# Patient Record
Sex: FEMALE | Race: WHITE | NOT HISPANIC OR LATINO | Employment: UNEMPLOYED | URBAN - METROPOLITAN AREA
[De-identification: names, ages, dates, MRNs, and addresses within clinical notes are randomized per-mention and may not be internally consistent; named-entity substitution may affect disease eponyms.]

---

## 2023-08-23 ENCOUNTER — HOSPITAL ENCOUNTER (OUTPATIENT)
Dept: DATA CONVERSION | Facility: HOSPITAL | Age: 33
Discharge: HOME | End: 2023-08-23
Payer: COMMERCIAL

## 2023-08-23 DIAGNOSIS — Z11.3 ENCOUNTER FOR SCREENING FOR INFECTIONS WITH A PREDOMINANTLY SEXUAL MODE OF TRANSMISSION: ICD-10-CM

## 2023-08-23 LAB
C TRACH RRNA SPEC QL NAA+PROBE: NORMAL
DRUG SCREEN COMMENT UR-IMP: NORMAL
HBV SURFACE AG SER QL: NEGATIVE
N GONORRHOEA RRNA SPEC QL NAA+PROBE: NORMAL
SPECIMEN SOURCE: NORMAL

## 2023-08-24 LAB
C GLABRATA DNA VAG QL NAA+PROBE: NEGATIVE
C KRUSEI DNA VAG QL NAA+PROBE: NEGATIVE
CANDIDA DNA VAG QL PROBE+SIG AMP: NEGATIVE
HCV AB SER QL: NORMAL
HIV 1+2 AB+HIV1 P24 AG SERPL QL IA: NORMAL
SYPHILIS SCREEN RSLT -LH SQ DATA CONVERSION: NORMAL
T VAGINALIS DNA VAG QL PROBE+SIG AMP: NEGATIVE
VAGINOSIS/ITIS DNA PNL VAG PROBE+SIG AMP: NEGATIVE

## 2023-09-07 ENCOUNTER — HOSPITAL ENCOUNTER (OUTPATIENT)
Dept: DATA CONVERSION | Facility: HOSPITAL | Age: 33
Discharge: HOME | End: 2023-09-07
Payer: COMMERCIAL

## 2023-09-07 DIAGNOSIS — N76.0 ACUTE VAGINITIS: ICD-10-CM

## 2023-09-07 LAB
C TRACH RRNA SPEC QL NAA+PROBE: NORMAL
DRUG SCREEN COMMENT UR-IMP: NORMAL
N GONORRHOEA RRNA SPEC QL NAA+PROBE: NORMAL
SPECIMEN SOURCE: NORMAL

## 2023-09-08 LAB
C GLABRATA DNA VAG QL NAA+PROBE: NEGATIVE
C KRUSEI DNA VAG QL NAA+PROBE: NEGATIVE
CANDIDA DNA VAG QL PROBE+SIG AMP: NEGATIVE
T VAGINALIS DNA VAG QL PROBE+SIG AMP: NEGATIVE
VAGINOSIS/ITIS DNA PNL VAG PROBE+SIG AMP: NEGATIVE

## 2024-02-08 ENCOUNTER — PROCEDURE VISIT (OUTPATIENT)
Dept: OBSTETRICS AND GYNECOLOGY | Facility: CLINIC | Age: 34
End: 2024-02-08
Payer: COMMERCIAL

## 2024-02-08 VITALS — BODY MASS INDEX: 22.94 KG/M2 | WEIGHT: 125.4 LBS

## 2024-02-08 DIAGNOSIS — Z30.46 NEXPLANON REMOVAL: Primary | ICD-10-CM

## 2024-02-08 PROCEDURE — 11982 REMOVE DRUG IMPLANT DEVICE: CPT | Performed by: OBSTETRICS & GYNECOLOGY

## 2024-02-08 RX ORDER — AMOXICILLIN 500 MG/1
CAPSULE ORAL
COMMUNITY
Start: 2024-02-06

## 2024-02-08 ASSESSMENT — PATIENT HEALTH QUESTIONNAIRE - PHQ9
1. LITTLE INTEREST OR PLEASURE IN DOING THINGS: NOT AT ALL
2. FEELING DOWN, DEPRESSED OR HOPELESS: NOT AT ALL
SUM OF ALL RESPONSES TO PHQ9 QUESTIONS 1 AND 2: 0

## 2024-02-08 ASSESSMENT — PAIN SCALES - GENERAL: PAINLEVEL: 0-NO PAIN

## 2024-02-08 ASSESSMENT — ENCOUNTER SYMPTOMS
OCCASIONAL FEELINGS OF UNSTEADINESS: 0
DEPRESSION: 0
LOSS OF SENSATION IN FEET: 0

## 2024-02-08 NOTE — PROGRESS NOTES
Patient ID: Anai Sapp is a 33 y.o. female.    ProceduresNexplanon Removal Note       Date of Removal:  02/08/24  No LMP recorded. Patient has had an implant.    Information related to removal of the implant:   Reason(s) for removal:  Desire pregnancy  Was implant palpable before removal?  Yes    Procedure Time Out Documentation       Procedure:    Implant identified.  Left upper arm prepped with Betadinex3.  1% lidocaine injected at planned incision site.  A vertical incision 2-3 mm was performed with an 15-blade scalpel at the distal end of implant.  The implant was removed using pop-out technique.  The implant was inspected and found to be intact and complete and was discarded.  Steri strips and then a bandaid and gauze pressure wrap dressing were applied to the site.  After removal instructions were given and verbally reviewed with the patient who acknowledged her understanding.      Difficulties with the implant removal procedure?  No    Birth control plans are none.

## 2024-10-09 ENCOUNTER — OFFICE VISIT (OUTPATIENT)
Dept: PRIMARY CARE | Facility: CLINIC | Age: 34
End: 2024-10-09
Payer: COMMERCIAL

## 2024-10-09 VITALS
DIASTOLIC BLOOD PRESSURE: 70 MMHG | BODY MASS INDEX: 23.98 KG/M2 | HEART RATE: 73 BPM | WEIGHT: 127 LBS | HEIGHT: 61 IN | SYSTOLIC BLOOD PRESSURE: 106 MMHG

## 2024-10-09 DIAGNOSIS — F41.0 PANIC ATTACKS: ICD-10-CM

## 2024-10-09 DIAGNOSIS — F41.9 ANXIETY: ICD-10-CM

## 2024-10-09 DIAGNOSIS — G43.E09 CHRONIC MIGRAINE WITH AURA WITHOUT STATUS MIGRAINOSUS, NOT INTRACTABLE: ICD-10-CM

## 2024-10-09 DIAGNOSIS — K21.9 GASTROESOPHAGEAL REFLUX DISEASE WITHOUT ESOPHAGITIS: Primary | ICD-10-CM

## 2024-10-09 PROBLEM — F43.10 POSTTRAUMATIC STRESS DISORDER: Status: ACTIVE | Noted: 2023-07-14

## 2024-10-09 PROBLEM — R07.9 CHEST PAIN: Status: ACTIVE | Noted: 2024-10-09

## 2024-10-09 PROBLEM — N76.0 VULVOVAGINITIS: Status: ACTIVE | Noted: 2024-04-17

## 2024-10-09 PROBLEM — T50.901A DRUG OVERDOSE: Status: ACTIVE | Noted: 2024-10-09

## 2024-10-09 PROBLEM — N94.89 VULVAR BURNING: Status: ACTIVE | Noted: 2024-04-17

## 2024-10-09 PROBLEM — G43.909 MIGRAINES: Status: ACTIVE | Noted: 2023-07-14

## 2024-10-09 PROBLEM — R87.810 CERVICAL HIGH RISK HPV (HUMAN PAPILLOMAVIRUS) TEST POSITIVE: Status: ACTIVE | Noted: 2021-09-13

## 2024-10-09 PROBLEM — N83.209 OVARIAN CYST RUPTURE: Status: ACTIVE | Noted: 2021-12-29

## 2024-10-09 PROBLEM — N93.9 ABNORMAL UTERINE BLEEDING: Status: ACTIVE | Noted: 2024-10-09

## 2024-10-09 PROBLEM — N89.8 VAGINAL ODOR: Status: ACTIVE | Noted: 2024-04-17

## 2024-10-09 PROBLEM — N89.8 VAGINAL IRRITATION: Status: ACTIVE | Noted: 2024-10-09

## 2024-10-09 PROCEDURE — 1036F TOBACCO NON-USER: CPT | Performed by: INTERNAL MEDICINE

## 2024-10-09 PROCEDURE — 3008F BODY MASS INDEX DOCD: CPT | Performed by: INTERNAL MEDICINE

## 2024-10-09 PROCEDURE — 99204 OFFICE O/P NEW MOD 45 MIN: CPT | Performed by: INTERNAL MEDICINE

## 2024-10-09 RX ORDER — RIMEGEPANT SULFATE 75 MG/75MG
75 TABLET, ORALLY DISINTEGRATING ORAL AS NEEDED
Qty: 16 TABLET | Refills: 1 | Status: SHIPPED | OUTPATIENT
Start: 2024-10-09

## 2024-10-09 RX ORDER — MINERAL OIL
180 ENEMA (ML) RECTAL
COMMUNITY
Start: 2023-05-30

## 2024-10-09 RX ORDER — CALC/MAG/B COMPLEX/D3/HERB 61
15 TABLET ORAL
COMMUNITY
Start: 2024-03-07 | End: 2024-10-09 | Stop reason: SDUPTHER

## 2024-10-09 RX ORDER — NAPROXEN 500 MG/1
1 TABLET ORAL
COMMUNITY
Start: 2024-01-15

## 2024-10-09 RX ORDER — RIMEGEPANT SULFATE 75 MG/75MG
75 TABLET, ORALLY DISINTEGRATING ORAL AS NEEDED
COMMUNITY
End: 2024-10-09 | Stop reason: SDUPTHER

## 2024-10-09 RX ORDER — CALC/MAG/B COMPLEX/D3/HERB 61
15 TABLET ORAL
Qty: 90 CAPSULE | Refills: 1 | Status: SHIPPED | OUTPATIENT
Start: 2024-10-09

## 2024-10-09 RX ORDER — CLOTRIMAZOLE AND BETAMETHASONE DIPROPIONATE 10; .64 MG/G; MG/G
1 CREAM TOPICAL 2 TIMES DAILY
COMMUNITY
Start: 2024-04-17

## 2024-10-09 RX ORDER — CLONAZEPAM 0.5 MG/1
0.5 TABLET ORAL DAILY
COMMUNITY
Start: 2024-04-19 | End: 2024-10-09 | Stop reason: SDUPTHER

## 2024-10-09 RX ORDER — CLONAZEPAM 0.5 MG/1
TABLET ORAL
Qty: 10 TABLET | Refills: 0 | Status: SHIPPED | OUTPATIENT
Start: 2024-10-09

## 2024-10-09 RX ORDER — FLUTICASONE PROPIONATE 50 MCG
1 SPRAY, SUSPENSION (ML) NASAL DAILY
COMMUNITY

## 2024-10-09 RX ORDER — BETA CAROTENE, CHOLECALCIFEROL, DL-ALPHA TOCOPHERYL ACETATE, ASCORBIC ACID, FOLIC ACID, THIAMIN MONONITRATE, RIBOFLAVIN, NIACINAMIDE, PYRIDOXINE HYDROCHLORIDE, CYANOCOBALAMIN, CALCIUM CARBONATE, POTAS 120; 4000; 200; 400; 8; 29; 1; 20; 150; 3; 3; 3; 15 MG/1; [IU]/1; MG/1; [IU]/1; UG/1; MG/1; MG/1; MG/1; UG/1; MG/1; MG/1; MG/1; MG/1
1 TABLET, FILM COATED ORAL
COMMUNITY
Start: 2024-08-26

## 2024-10-09 NOTE — PROGRESS NOTES
Assessment/Plan   Problem List Items Addressed This Visit       Anxiety    Relevant Medications    clonazePAM (KlonoPIN) 0.5 mg tablet    Other Relevant Orders    Referral to Psychiatry    Gastroesophageal reflux disease - Primary    Relevant Medications    lansoprazole (Prevacid) 15 mg DR capsule    Migraines    Relevant Medications    Nurtec ODT 75 mg tablet,disintegrating    Panic attacks    Relevant Medications    clonazePAM (KlonoPIN) 0.5 mg tablet    Other Relevant Orders    Referral to Psychiatry   Conditions discussed  In view of the fact that she will plan for pregnancy she must discuss for any medication to be to stopped even before planning for pregnancy as per advised from OB  She was also mentioned that she must see psychiatrist for optimal treatment and care in relation to anxiety and panic attack  Clonazepam is not the answer for the problem only 10 tablet is being prescribed for acute setting during intervening.  GERD she is taking PPI refill provided    Migraine she should follow with neurologist in the meantime prescription has been given but also she should discuss with OB about continuation of this medication and alternative at the time when she plan for pregnancy    Subjective   Patient ID: Anai Sapp is a 34 y.o. female who presents for Establish Care (Patient states that she needed to get a new PCP due to no longer working at [employer] [facility] [employer].  ).    Past Surgical History:   Procedure Laterality Date    BREAST SURGERY      x2    LABIAPLASTY        Family History   Problem Relation Name Age of Onset    Hypertension Mother      Hyperlipidemia Mother      Crohn's disease Father        Social History     Socioeconomic History    Marital status: Single     Spouse name: Not on file    Number of children: Not on file    Years of education: Not on file    Highest education level: Not on file   Occupational History    Not on file   Tobacco Use    Smoking status: Former     Current packs/day: 0.00     Types:  Cigarettes     Quit date:      Years since quittin.7    Smokeless tobacco: Never   Vaping Use    Vaping status: Some Days   Substance and Sexual Activity    Alcohol use: Yes     Alcohol/week: 2.0 - 3.0 standard drinks of alcohol     Types: 2 - 3 Standard drinks or equivalent per week    Drug use: Yes     Types: Marijuana     Comment: on occasion    Sexual activity: Not on file   Other Topics Concern    Not on file   Social History Narrative    Not on file     Social Determinants of Health     Financial Resource Strain: Patient Declined (2023)    Received from [employer], [employer]    Overall Financial Resource Strain (CARDIA)     Difficulty of Paying Living Expenses: Patient declined   Food Insecurity: Patient Declined (2023)    Received from [employer]    Hunger Vital Sign     Worried About Running Out of Food in the Last Year: Patient declined     Ran Out of Food in the Last Year: Patient declined   Transportation Needs: Patient Declined (2023)    Received from [employer], [employer]    PRAPARE - Transportation     Lack of Transportation (Medical): Patient declined     Lack of Transportation (Non-Medical): Patient declined   Physical Activity: Sufficiently Active (2023)    [facility]    Exercise Vital Sign     Days of Exercise per Week: 5 days     Minutes of Exercise per Session: 60 min   Stress: Stress Concern Present (2023)    [facility] [place] [facility] - Occupational Stress Questionnaire     Feeling of Stress : To some extent   Social Connections: Socially Isolated (2023)    [facility]    Social Connection and Isolation Panel [NHANES]     Frequency of Communication with Friends and Family: More than three times a week     Frequency of Social Gatherings with Friends and Family: Three  "times a week     Attends Baptism Services: Never     Active Member of Clubs or Organizations: No     Attends Club or Organization Meetings: Never     Marital Status:    Intimate Partner Violence: Patient Declined (9/5/2023)    Received from [employer], [employer]    Humiliation, Afraid, Rape, and Kick questionnaire     Fear of Current or Ex-Partner: Patient declined     Emotionally Abused: Patient declined     Physically Abused: Patient declined     Sexually Abused: Patient declined   Housing Stability: Unknown (9/5/2023)    Received from [employer], [employer]    Housing Stability Vital Sign     Unable to Pay for Housing in the Last Year: Patient refused     Number of Places Lived in the Last Year: Not on file     Unstable Housing in the Last Year: Patient refused      Sumatriptan   Current Outpatient Medications   Medication Sig Dispense Refill    clotrimazole-betamethasone (Lotrisone) cream Apply 1 Application topically 2 times a day.      fexofenadine (Allegra) 180 mg tablet Take 1 tablet (180 mg) by mouth once daily.      fluticasone (Flonase) 50 mcg/actuation nasal spray Administer 1 spray into each nostril once daily.      naproxen (Naprosyn) 500 mg tablet Take 1 tablet (500 mg) by mouth 2 times a day before meals.      Prenatabs FA 29-1 mg tablet Take 1 tablet by mouth early in the morning..      clonazePAM (KlonoPIN) 0.5 mg tablet Use daqily prn at panic and anxiety attack 10 tablet 0    lansoprazole (Prevacid) 15 mg DR capsule Take 1 capsule (15 mg) by mouth once daily. 90 capsule 1    Nurtec ODT 75 mg tablet,disintegrating Take 1 tablet (75 mg) by mouth if needed (Migraines). 16 tablet 1     No current facility-administered medications for this visit.      Vitals:    10/09/24 1519   BP: 106/70   BP Location: Left arm   Patient Position: Sitting   Pulse: 73   Weight: 57.6 kg (127 lb)   Height: 1.549 m (5' 1\")      Problem List Items Addressed This Visit       Anxiety    Relevant " Medications    clonazePAM (KlonoPIN) 0.5 mg tablet    Other Relevant Orders    Referral to Psychiatry    Gastroesophageal reflux disease - Primary    Relevant Medications    lansoprazole (Prevacid) 15 mg DR capsule    Migraines    Relevant Medications    Nurtec ODT 75 mg tablet,disintegrating    Panic attacks    Relevant Medications    clonazePAM (KlonoPIN) 0.5 mg tablet    Other Relevant Orders    Referral to Psychiatry      Orders Placed This Encounter   Procedures    Referral to Psychiatry     Standing Status:   Future     Standing Expiration Date:   10/9/2025     Referral Priority:   Routine     Referral Type:   Consultation     Referral Reason:   Specialty Services Required     Requested Specialty:   Psychiatry     Number of Visits Requested:   1        HPI  This 34-year-old female who came as a new patient with review to get refill on her medication  She says that she has been on clonazepam over 10 years and being used intermittently and has been given by other physician who was taking care of  She has sometimes anxiety and panic attack  She did not want to take medication daily  She is also discussing with OB/GYN regarding pregnancy planning  She is not sure whether she has seen psychiatrist and I do not know the status of the people who prescribed clonazepam to her from [facility] report which was reviewed  She has seen neurologist and she had migraine and uses Nurtec for migraine attack  Safety of Nurtec in pregnancy should also be discussed by OB physician she was told and should only take when it is okayed by OB  She has been taking Prevacid for reflux symptoms as well    ROS otherwise has been negative other than mentioned above  Past medical history  As mentioned above  Which are GERD  Allergic rhinitis  Chronic migraine  Anxiety and panic attack  Social family history allergies and current medications as recorded    PHYSICAL EXAM  Cardiovascular chest abdominal neurological examination normal  Chart and  "labs reviewed    No results found for: \"PR1\", \"BMPR1A\", \"CMPLAS\", \"QB6GRIND\", \"KPSAT\"   No results found for: \"CHOL\", \"LDLCALC\", \"HDLC\", \"LCTRG\", \"CHHDL\"             "

## 2024-10-11 ENCOUNTER — OFFICE VISIT (OUTPATIENT)
Dept: BEHAVIORAL HEALTH | Facility: CLINIC | Age: 34
End: 2024-10-11
Payer: COMMERCIAL

## 2024-10-11 VITALS
HEART RATE: 71 BPM | HEIGHT: 61 IN | TEMPERATURE: 98.3 F | SYSTOLIC BLOOD PRESSURE: 109 MMHG | BODY MASS INDEX: 23.68 KG/M2 | RESPIRATION RATE: 18 BRPM | DIASTOLIC BLOOD PRESSURE: 63 MMHG | WEIGHT: 125.4 LBS

## 2024-10-11 DIAGNOSIS — F41.0 PANIC ATTACKS: ICD-10-CM

## 2024-10-11 DIAGNOSIS — F41.9 ANXIETY: ICD-10-CM

## 2024-10-11 ASSESSMENT — PAIN SCALES - GENERAL: PAINLEVEL: 0-NO PAIN

## 2024-10-15 ENCOUNTER — OFFICE VISIT (OUTPATIENT)
Dept: PRIMARY CARE | Facility: CLINIC | Age: 34
End: 2024-10-15
Payer: COMMERCIAL

## 2024-10-15 VITALS
SYSTOLIC BLOOD PRESSURE: 107 MMHG | BODY MASS INDEX: 23.75 KG/M2 | HEART RATE: 74 BPM | DIASTOLIC BLOOD PRESSURE: 76 MMHG | WEIGHT: 125.8 LBS | HEIGHT: 61 IN

## 2024-10-15 DIAGNOSIS — M54.2 CERVICALGIA: Primary | ICD-10-CM

## 2024-10-15 PROCEDURE — 99213 OFFICE O/P EST LOW 20 MIN: CPT | Performed by: INTERNAL MEDICINE

## 2024-10-15 PROCEDURE — 3008F BODY MASS INDEX DOCD: CPT | Performed by: INTERNAL MEDICINE

## 2024-10-15 PROCEDURE — 1036F TOBACCO NON-USER: CPT | Performed by: INTERNAL MEDICINE

## 2024-10-15 ASSESSMENT — PATIENT HEALTH QUESTIONNAIRE - PHQ9
SUM OF ALL RESPONSES TO PHQ9 QUESTIONS 1 AND 2: 0
1. LITTLE INTEREST OR PLEASURE IN DOING THINGS: NOT AT ALL
2. FEELING DOWN, DEPRESSED OR HOPELESS: NOT AT ALL

## 2024-10-15 NOTE — PROGRESS NOTES
"Assessment/Plan   Problem List Items Addressed This Visit       Cervicalgia - Primary    Relevant Orders    Referral to Physical Therapy   From the description it is a mechanical neck pain  And in view of the plan of pregnancy and other things imaging studies and x-ray exposure is being avoided  Heating pad and physical therapy advised  Follow-up as needed    Subjective   Patient ID: Anai Sapp is a 34 y.o. female who presents for Neck Pain (Patient states that she was at a music festival in September and \"tweaked\" her neck.  States that she has a hard time with lifting her head up and down.  ).    Past Surgical History:   Procedure Laterality Date    BREAST SURGERY      x2    LABIAPLASTY        Family History   Problem Relation Name Age of Onset    Hypertension Mother      Hyperlipidemia Mother      Crohn's disease Father        Social History     Socioeconomic History    Marital status: Single     Spouse name: Not on file    Number of children: Not on file    Years of education: Not on file    Highest education level: Not on file   Occupational History    Not on file   Tobacco Use    Smoking status: Former     Current packs/day: 0.00     Types: Cigarettes     Quit date:      Years since quittin.7    Smokeless tobacco: Never   Vaping Use    Vaping status: Some Days   Substance and Sexual Activity    Alcohol use: Yes     Alcohol/week: 2.0 - 3.0 standard drinks of alcohol     Types: 2 - 3 Standard drinks or equivalent per week    Drug use: Yes     Types: Marijuana     Comment: on occasion    Sexual activity: Not on file   Other Topics Concern    Not on file   Social History Narrative    Not on file     Social Determinants of Health     Financial Resource Strain: Patient Declined (2023)    Received from [employer] [employer] [employer]    Overall Financial Resource Strain (CARDIA)     Difficulty of Paying Living Expenses: Patient declined   Food Insecurity: Patient Declined (2023)    " Received from [employer]    Hunger Vital Sign     Worried About Running Out of Food in the Last Year: Patient declined     Ran Out of Food in the Last Year: Patient declined   Transportation Needs: Patient Declined (9/5/2023)    Received from [employer], [employer]    PRAPARE - Transportation     Lack of Transportation (Medical): Patient declined     Lack of Transportation (Non-Medical): Patient declined   Physical Activity: Sufficiently Active (9/5/2023)    Received from [employer], [employer], [employer]    Exercise Vital Sign     Days of Exercise per Week: 5 days     Minutes of Exercise per Session: 60 min   Stress: Stress Concern Present (9/5/2023)    Received from [employer], [employer]    [place] of Occupational Health - Occupational Stress Questionnaire     Feeling of Stress : To some extent   Social Connections: Socially Isolated (9/5/2023)    Received from [employer], [employer]    Social Connection and Isolation Panel [NHANES]     Frequency of Communication with Friends and Family: More than three times a week     Frequency of Social Gatherings with Friends and Family: Three times a week     Attends Taoist Services: Never     Active Member of Clubs or Organizations: No     Attends Club or Organization Meetings: Never     Marital Status:    Intimate Partner Violence: Patient Declined (9/5/2023)    Received from [employer], [employer]    Humiliation, Afraid, Rape, and Kick questionnaire     Fear of Current or Ex-Partner: Patient declined     Emotionally Abused: Patient declined     Physically Abused: Patient declined     Sexually Abused: Patient declined   Housing Stability: Unknown (9/5/2023)    Received from [employer]    Housing Stability Vital Sign     Unable to Pay for Housing in the Last Year: Patient refused     Number of Places Lived in the Last Year: Not on file     Unstable Housing in the  "Last Year: Patient refused      Sumatriptan   Current Outpatient Medications   Medication Sig Dispense Refill    clonazePAM (KlonoPIN) 0.5 mg tablet Use daqily prn at panic and anxiety attack 10 tablet 0    clotrimazole-betamethasone (Lotrisone) cream Apply 1 Application topically 2 times a day.      fexofenadine (Allegra) 180 mg tablet Take 1 tablet (180 mg) by mouth once daily.      fluticasone (Flonase) 50 mcg/actuation nasal spray Administer 1 spray into each nostril once daily.      lansoprazole (Prevacid) 15 mg DR capsule Take 1 capsule (15 mg) by mouth once daily. 90 capsule 1    naproxen (Naprosyn) 500 mg tablet Take 1 tablet (500 mg) by mouth 2 times a day before meals.      Nurtec ODT 75 mg tablet,disintegrating Take 1 tablet (75 mg) by mouth if needed (Migraines). 16 tablet 1    Prenatabs FA 29-1 mg tablet Take 1 tablet by mouth early in the morning..       No current facility-administered medications for this visit.      Vitals:    10/15/24 1511   BP: 107/76   BP Location: Left arm   Patient Position: Sitting   Pulse: 74   Weight: 57.1 kg (125 lb 12.8 oz)   Height: 1.549 m (5' 1\")      Problem List Items Addressed This Visit       Cervicalgia - Primary    Relevant Orders    Referral to Physical Therapy      Orders Placed This Encounter   Procedures    Referral to Physical Therapy     Standing Status:   Future     Standing Expiration Date:   10/15/2025     Referral Priority:   Routine     Referral Type:   Consultation     Referral Reason:   Specialty Services Required     Requested Specialty:   Physical Therapy     Number of Visits Requested:   1        HPI  See above patient presenting symptoms  She continues to have neck pain discomfort without radiating to the arm and she has been using some heating pad without benefit      ROS otherwise negative  Past medical history reviewed  Social and family history reviewed  Allergies and medications reviewed  Recent labs reviewed  Vital signs reviewed    PHYSICAL " "EXAM  Movement of the neck causes discomfort in the nape of the neck and the back      No results found for: \"PR1\", \"BMPR1A\", \"CMPLAS\", \"XV9UFMAD\", \"KPSAT\"   No results found for: \"CHOL\", \"LDLCALC\", \"HDLC\", \"LCTRG\", \"CHHDL\"             "

## 2024-10-25 RX ORDER — NAPROXEN 500 MG/1
500 TABLET ORAL
Qty: 180 TABLET | Refills: 1 | OUTPATIENT
Start: 2024-10-25

## 2024-11-07 ENCOUNTER — PATIENT MESSAGE (OUTPATIENT)
Dept: PRIMARY CARE | Facility: CLINIC | Age: 34
End: 2024-11-07
Payer: COMMERCIAL

## 2024-11-07 DIAGNOSIS — G43.E09 CHRONIC MIGRAINE WITH AURA WITHOUT STATUS MIGRAINOSUS, NOT INTRACTABLE: ICD-10-CM

## 2024-11-07 RX ORDER — RIMEGEPANT SULFATE 75 MG/75MG
75 TABLET, ORALLY DISINTEGRATING ORAL AS NEEDED
Qty: 16 TABLET | Refills: 1 | Status: CANCELLED | OUTPATIENT
Start: 2024-11-07

## 2024-11-07 RX ORDER — NAPROXEN 500 MG/1
500 TABLET ORAL
Qty: 180 TABLET | Refills: 1 | Status: SHIPPED | OUTPATIENT
Start: 2024-11-07

## 2024-11-13 ENCOUNTER — APPOINTMENT (OUTPATIENT)
Dept: PRIMARY CARE | Facility: CLINIC | Age: 34
End: 2024-11-13
Payer: COMMERCIAL

## 2024-11-13 ASSESSMENT — LIFESTYLE VARIABLES: HISTORY_OF_SMOKING: I SMOKED IN THE PAST, BUT NOT REGULARLY

## 2024-11-15 ENCOUNTER — APPOINTMENT (OUTPATIENT)
Dept: BEHAVIORAL HEALTH | Facility: CLINIC | Age: 34
End: 2024-11-15
Payer: COMMERCIAL

## 2025-01-16 ENCOUNTER — PATIENT MESSAGE (OUTPATIENT)
Dept: ENDOCRINOLOGY | Facility: CLINIC | Age: 35
End: 2025-01-16
Payer: COMMERCIAL

## 2025-01-16 ASSESSMENT — LIFESTYLE VARIABLES
HISTORY_ALCOHOL_USE: YES
TOBACCO_USE: NO
TOBACCO_USE: NO
HISTORY_ALCOHOL_USE: YES

## 2025-01-17 ENCOUNTER — CONSULT (OUTPATIENT)
Dept: ENDOCRINOLOGY | Facility: CLINIC | Age: 35
End: 2025-01-17
Payer: COMMERCIAL

## 2025-01-17 ENCOUNTER — LAB (OUTPATIENT)
Dept: LAB | Facility: LAB | Age: 35
End: 2025-01-17
Payer: COMMERCIAL

## 2025-01-17 VITALS
BODY MASS INDEX: 25.45 KG/M2 | HEART RATE: 83 BPM | DIASTOLIC BLOOD PRESSURE: 70 MMHG | SYSTOLIC BLOOD PRESSURE: 103 MMHG | HEIGHT: 61 IN | WEIGHT: 134.8 LBS

## 2025-01-17 DIAGNOSIS — Z13.29 SCREENING FOR THYROID DISORDER: ICD-10-CM

## 2025-01-17 DIAGNOSIS — Z31.41 FERTILITY TESTING: ICD-10-CM

## 2025-01-17 DIAGNOSIS — Z11.59 ENCOUNTER FOR SCREENING FOR OTHER VIRAL DISEASES: ICD-10-CM

## 2025-01-17 DIAGNOSIS — Z01.83 ENCOUNTER FOR RH BLOOD TYPING: ICD-10-CM

## 2025-01-17 DIAGNOSIS — Z01.812 ENCOUNTER FOR PREPROCEDURAL LABORATORY EXAMINATION: ICD-10-CM

## 2025-01-17 DIAGNOSIS — Z13.71 SCREENING FOR GENETIC DISEASE CARRIER STATUS: ICD-10-CM

## 2025-01-17 DIAGNOSIS — N97.9 SECONDARY FEMALE INFERTILITY: Primary | ICD-10-CM

## 2025-01-17 LAB
ABO GROUP (TYPE) IN BLOOD: NORMAL
ANTIBODY SCREEN: NORMAL
RH FACTOR (ANTIGEN D): NORMAL
RUBV IGG SERPL IA-ACNC: 1.1 IA
RUBV IGG SERPL QL IA: POSITIVE
TSH SERPL-ACNC: 0.69 MIU/L (ref 0.44–3.98)
VARICELLA ZOSTER IGG INDEX: 5.2 IA
VZV IGG SER QL IA: POSITIVE

## 2025-01-17 PROCEDURE — 83516 IMMUNOASSAY NONANTIBODY: CPT

## 2025-01-17 PROCEDURE — 99214 OFFICE O/P EST MOD 30 MIN: CPT

## 2025-01-17 PROCEDURE — 99204 OFFICE O/P NEW MOD 45 MIN: CPT

## 2025-01-17 PROCEDURE — 36415 COLL VENOUS BLD VENIPUNCTURE: CPT

## 2025-01-17 ASSESSMENT — PATIENT HEALTH QUESTIONNAIRE - PHQ9
2. FEELING DOWN, DEPRESSED OR HOPELESS: NOT AT ALL
SUM OF ALL RESPONSES TO PHQ9 QUESTIONS 1 AND 2: 0
1. LITTLE INTEREST OR PLEASURE IN DOING THINGS: NOT AT ALL

## 2025-01-17 ASSESSMENT — PAIN SCALES - GENERAL: PAINLEVEL_OUTOF10: 0-NO PAIN

## 2025-01-17 ASSESSMENT — COLUMBIA-SUICIDE SEVERITY RATING SCALE - C-SSRS
1. IN THE PAST MONTH, HAVE YOU WISHED YOU WERE DEAD OR WISHED YOU COULD GO TO SLEEP AND NOT WAKE UP?: NO
2. HAVE YOU ACTUALLY HAD ANY THOUGHTS OF KILLING YOURSELF?: NO
6. HAVE YOU EVER DONE ANYTHING, STARTED TO DO ANYTHING, OR PREPARED TO DO ANYTHING TO END YOUR LIFE?: NO

## 2025-01-17 NOTE — PROGRESS NOTES
Visit Type: In Person    NEW FERTILITY PATIENT VISIT    Referred by:  none    Accompanied today by: Saul call       Anai Sapp is a 34 y.o.  female who presents with secondary infertility, trying to conceive x 13 months, FSH levels at home states she has diminished reserve, would like to discuss fertility testing and treatment.      Have you had any concerns about your fertility treatments so far? No   What are you goals for today's visit? Learn how to bexome pregant   What causes of infertility have been identified on your workup so far? Diminishing eggs   Past Infertility Treatments: No    Please summarize your fertility treatments to date.     As far as you are aware, do you have insurance coverage for fertility diagnostic testing and/or fertility treatments?        PRIOR EVALUATION / TREATMENT: none    Hysterosalpingogram: no  Saline Infused Sonography: no  GYN Pelvic Ultrasound: no      Prior Labs  Lab Results    Date Done      AMH: No results found for requested labs within last 1825 days. No results found for requested labs within last 1825 days.   TSH: 0.90 (Ref range: 0.44 - 3.98 mIU/L) 2023   PRL: 7.0 (Ref range: 3.0 - 20.0 ug/L) 2023   Testosterone: No results found for requested labs within last 1825 days. No results found for requested labs within last 1825 days.   DHEAS: No results found for requested labs within last 1825 days. No results found for requested labs within last 1825 days.   FSH: 9.8 (Ref range: IU/L) 2023   17 OHP: No results found for requested labs within last 1825 days. No results found for requested labs within last 1825 days.   HgbA1c: 5.3 (Ref range: 4.3 - 5.6 %) 10/4/2021   Hepatitis B surface antigen: negative 2024   Hepatitis C antibody: negative 2024   HIV ½ Antigen Antibody screen with reflex: negative 2024   Syphilis screening with reflex: negative 2024   GC: negative 2024   CT: negative 2024   Type and Screen: B  2021   Rh: POS 2021   Antibody: No results found for requested labs within last 1825 days. No results found for requested labs within last 1825 days.   Rubella: No results found for requested labs within last 1825 days. No results found for requested labs within last 1825 days.   Varicella: No results found for requested labs within last 1825 days. No results found for requested labs within last 1825 days.   Hemoglobin: No results found for requested labs within last 1825 days. No results found for requested labs within last 1825 days.   Hematocrit: No results found for requested labs within last 1825 days. No results found for requested labs within last 1825 days.   Creatinine: 0.71 (Ref range: 0.50 - 1.05 mg/dL) 9/3/2023   AST:20 (Ref range: 9 - 39 U/L) 9/3/2023   ALT:22 (Ref range: 7 - 45 U/L): 9/3/2023     Relationship Status:  getting     Have you ever been pregnant? Yes  How many times have you been pregnant? 3  Have you ever had a miscarriage? Yes  How many times have you had a miscarriage? 1       OB Hx: :  EAB 2015 FT IOL  female 6.10 no complications pregnancy/delivery   SAB partial molar pregnancy with D&C     OB History          3    Para   1    Term   1            AB   2    Living   1         SAB   1    IAB        Ectopic        Multiple        Live Births   1                 GYN HISTORY   Have you ever been diagnosed with a sexually transmitted disease? No  Have you ever had Pelvic Inflammatory Disease?   Have you had an abnormal PAP smear? HPV, Yes  Date & Result of last PAP smear: 2033- negative  Have you ever had an abnormal Mammogram? No  Date & result of your last mammogram:  NA  Do you have pelvic pain? No  How many times per week do you have intercourse? 1-2x a day  Do you have pain with intercourse? No  Do you use lubricants with intercourse?    Do you have pain with bowel movements? No  Do you have pain with a full bladder?  No    MENSTRUAL HISTORY  LMP: 2025  Menarche: 14 years old  Contraception:  IUD, OCP- P4 only, nexplanon, nuvaring  Cycle length: 30  Describe your bleedin-5 days Average  Dysmenorrhea:  no     ENDOCRINE/INFERTILITY HISTORY  Duration of infertility: 13 months  Coital Activity/week: 1-2x a day  Nipple Discharge: No  Vision changes: No  Headaches: yes- migraines with aura  Excess hair growth: No  Excessive hair loss: No  Acne: No  Oily skin: No  Recent weight change  Weight gain: No  Weight loss: No  Exercise more than 3 times a week: Yes      PMH  History reviewed. No pertinent past medical history.     MEDICATIONS  Current Outpatient Medications on File Prior to Visit   Medication Sig Dispense Refill    clonazePAM (KlonoPIN) 0.5 mg tablet Use daqily prn at panic and anxiety attack 10 tablet 0    clotrimazole-betamethasone (Lotrisone) cream Apply 1 Application topically 2 times a day.      fexofenadine (Allegra) 180 mg tablet Take 1 tablet (180 mg) by mouth once daily.      fluticasone (Flonase) 50 mcg/actuation nasal spray Administer 1 spray into each nostril once daily.      lansoprazole (Prevacid) 15 mg DR capsule Take 1 capsule (15 mg) by mouth once daily. 90 capsule 1    naproxen (Naprosyn) 500 mg tablet Take 1 tablet (500 mg) by mouth 2 times a day before meals. 180 tablet 1    Nurtec ODT 75 mg tablet,disintegrating Take 1 tablet (75 mg) by mouth if needed (Migraines). 16 tablet 1    Prenatabs FA 29-1 mg tablet Take 1 tablet by mouth early in the morning..       No current facility-administered medications on file prior to visit.        PSH  Past Surgical History:   Procedure Laterality Date    BREAST SURGERY      x2    LABIAPLASTY          PSYCH HISTORY  Have you ever been diagnosed with a mental health Issue? No  Have you ever been hospitalized for a mental health disorder? No       SOCIAL HISTORY  Social History     Tobacco Use    Smoking status: Former     Current packs/day: 0.00     Types:  "Cigarettes     Quit date:      Years since quitting: 10.0    Smokeless tobacco: Never   Vaping Use    Vaping status: Some Days   Substance Use Topics    Alcohol use: Yes     Alcohol/week: 2.0 - 3.0 standard drinks of alcohol     Types: 2 - 3 Standard drinks or equivalent per week    Drug use: Not Currently     Types: Marijuana     Comment: on occasion     Occupation:  real estate  Have you ever been incarcerated? No  Do you have a history of domestic violence? No  Do you feel safe at home? No  Do you have a history of any negative sexual experience such as incest or rape? No       PARTNER HISTORY  Partner Name: Pro call  Partner : [id number]  Partner email: [email or web address]  Occupation: [employer]  Prior fertility history:  yes- 1  PMH:  no  PSH:  no  Smoking:Alcohol Use: Yes  Drug Use: No  Medications:  protonix  Injuries: No  STD: No  SA: No  SA Results:  NA    FAMILY HISTORY  Family History   Problem Relation Name Age of Onset    Hypertension Mother      Hyperlipidemia Mother      Crohn's disease Father         CANCER HISTORY    Breast: No  Ovarian: No  Colon:  no  Endometrial: No      FAMILY VTE HISTORY  Family History of Blood Clots: No      GENETIC HISTORY  Ethnic Background  Patient: Turkish and Bermudian  Partner: Sri Lankan and Yakut  Genetic Disease in Family  Patient: No  Partner: No  Birth Defects in Family  Patient: No  Partner: No    Genetic screening performed previously:  no     BMI:   BMI Readings from Last 1 Encounters:   25 25.47 kg/m²     VITALS:  /70   Pulse 83   Ht 1.549 m (5' 1\")   Wt 61.1 kg (134 lb 12.8 oz)   LMP 2025   BMI 25.47 kg/m²     ASSESSMENT   34 y.o.  female with  secondary infertility x 13 months, suspected ovulation and the following pertinent medical issues: h/o partial molar pregnancy  with D&C.    Partner SA: No Assessment    IVF Sart Calculator for IVF success:    With 1st IVF cycle: A complete cycle includes all fresh and " frozen-thawed embryo transfers resulting from one egg collection.  **Your chance of having your first baby after the 1st complete cycle of IVF is: 57.05%. This means that out of 100 couples undertaking a 1st complete cycle, approximately 57 would have a baby.    With 2nd IVF cycle:  A complete cycle includes all fresh and frozen-thawed embryo transfers resulting from one egg collection.  **Your chance of having your first baby after a maximum of 2 complete cycles of IVF is: 75.74%. This means that out of 100 couples undertaking a maximum of 2 complete cycles, approximately 76 would have a baby.      COUNSELING  We discussed causes of infertility including hormonal, egg quality issues, structural problems such as endometriosis, adhesions, or tubal problems, uterine factors such as polyps or fibroids, and sperm issues. Reviewed evaluation of such as well. We discussed various methods for achieving pregnancy in some detail including, ovulation induction, insemination, superovulation and IVF.    We discussed that Letrozole is used for ovulation induction and that recent studies have found letrozole is superior to Clomid for ovulation induction in patients with PCOS. We discussed common side effects of Letrozole including headaches, vision changes, hot flashes, mood changes, and breast tenderness.  Letrozole is NOT FDA approved for the treatment of infertility and was initially associated with (in some studies) a higher risk of congenital anomalies, although this was not found in a more recent large study of patients taking Letrozole for unexplained infertility. Patients must take a pregnancy test prior to starting Letrozole each month. We discussed that there is an increased risk of multiple gestation with Letrozole approximately 10% for twins and less than 1% for triplets.  Counseled regarding option of selective reduction for higher order multiples.    We discussed AMH testing and the patient's AMH level, if  "applicable.  AMH is considered favorable if >1 however this test has many limitations including poor predictive rates of pregnancy. In randomized control trials such as \"Time to conceive\" pts with low AMH levels (<0.7) has similar cumulative pregnancy rates compared with women that had normal AMH levels.  In the \"AMIGOS\" study, AMH did not predict pregnancy rates following OS/IUI for unexplained infertility. However,  AMH is a marker that is helpful to predict how a patient may respond or oocyte yields with FSH and ART treatments, but again there is conflicting data about how this affects pregnancy rates with ART.    INSTRUCTIONS FOR INFERTILITY TESTING    Semen Analysis  The semen samples that you have been asked to submit are important for diagnostic and therapeutic purposes.  Please abstain from ejaculating 2-3 days prior to collecting the sample.  The sample should be produced by masturbation.  You will need to collect the ejaculate into the container supplied by the [facility] (you can get containers at your doctor's office).  Do not use other plastic containers from home such as Tupperware as these containers as they may interfere with the test results.  Lubricants and saliva also interfere with test results.  If a collection condom is necessary, please request one at the Andrology lab and they will provide you with an appropriate collection condom.  It is extremely important that the entire sample is collected in the container as the first few drops of ejaculate contain a major portion of sperm.  If you do not collect the entire specimen, please inform the technologist.      You can collect at home as long as you can get the sample to the [facility] lab within 1 hour of collecting.  Please carmen your name and time of collection on the container. You should carry the container close to your body.  Collection rooms are available at all locations as well.     An appointment is needed to ensure the lab " has enough time to process your specimen.  Please call [phone] to schedule this appointment.     Hysterosalpingogram (HSG or x-ray dye test)  An HSG is a test used to make sure your fallopian tubes are not blocked.  This test can only be done between cycle days 5-11, so it is important for you to call as soon as your period starts to schedule an appointment.  You should take ibuprofen 30 minutes before your appointment as this test can cause transient cramping.      If you are allergic to iodine or shellfish, please inform the . Please call [phone] to schedule this appointment.      PELVIC ULTRASOUNDS  Please call [phone] to schedule this appointment.      Continue to take a prenatal vitamin daily (800mcg or 0.8mg folic acid)    Please reach out to the office for a plan once all the testing is complete- I am not notified when everything on this list is complete, so please call the office directly to check in. The office number is [phone].    If you are interested in integrative medicine to support your care, please contact [facility] at [phone]. Services available include acupuncture, integrative medicine consultations, massage, meditation and stress management. For acupuncture specifically, please ask for Leela Ovalle (east side) or Gaviota Centeno (west side).     We also offer a virtual support group every Monday from 6pm-7pm. For more info please email:    For more information and to RSVP, email [email or web address]       If you have any questions and wish to review with a member of our team, please do CALL THE OFFICE during business hours. The office number is [phone].       Routine Testing  Fertility Center  STDs Within 1 year   Genetic carrier Waiver/Completed   T&S Within 1 year   AMH Within 1 year   TSH Within 1 year   Rubella/Varicella Within 5 years     PLAN  AMH  T&S  TSH  STDs already done 8-2024 &  9-2024  Rubella/Varicella  [facility]  Pelvic Ultrasound- call CD 1 to schedule  HSG- call CD 1 to schedule  try to schedule US & HSG same day since coming from [place]   Financial consult- for HSG & IVF, has caresomaximo- may get onto partner's insurance after they get   Schedule IVF consult- may want gender selection  Chart to primary nurse for care coordination and patient check list/education  Enroll in Engaged MD  Take prenatal vitamins, vitamin D 2000 IUs daily  Discussed that pap and mammogram must be updated per ACOG guidelines before treatment can begin- up to date for pap test  Discussed that treatment cannot proceed until checklist items are complete   6-8 week IN PERSON- lives in [place], or can do virtual visit across state line  Additional testing for BMI < 18 or > 40: NA      MD Completion:  Ectopic Risk: No  Medically Complex: No    Partner:   SA  STDs  [facility]  Recommend Male Vitamins: Discussed as follows: Co-Q10 200 mg daily, L-Carnitine 200-500 mg daily, Vitamin C 500 mg daily    Intimate Exam Performed: No, an intimate exam was not performed at this encounter.     FOLLOW UP PLAN:  Letrozole 2.5 mg days 3-7/TIC x 3 cycles  If no success can add in monitoring/HCG Trigger for TIC vs IUI + LP P4 support  Consider IVF, discussed IUI briefly      Kaela Garcia CNP 01/17/25 12:51 PM

## 2025-01-21 LAB — MIS SERPL-MCNC: 1.24 NG/ML (ref 0.18–11.71)

## 2025-01-31 LAB
COMMENTS - MP RESULT TYPE: NORMAL
SCAN RESULT: NORMAL